# Patient Record
Sex: MALE | Race: WHITE | URBAN - METROPOLITAN AREA
[De-identification: names, ages, dates, MRNs, and addresses within clinical notes are randomized per-mention and may not be internally consistent; named-entity substitution may affect disease eponyms.]

---

## 2017-01-18 ENCOUNTER — OFFICE VISIT (OUTPATIENT)
Dept: INTERNAL MEDICINE | Facility: CLINIC | Age: 32
End: 2017-01-18
Payer: MEDICARE

## 2017-01-18 VITALS
BODY MASS INDEX: 21.7 KG/M2 | HEIGHT: 72 IN | WEIGHT: 160.2 LBS | DIASTOLIC BLOOD PRESSURE: 52 MMHG | HEART RATE: 57 BPM | SYSTOLIC BLOOD PRESSURE: 117 MMHG | TEMPERATURE: 97.4 F

## 2017-01-18 DIAGNOSIS — F31.9 BIPOLAR AFFECTIVE DISORDER, REMISSION STATUS UNSPECIFIED (H): Primary | ICD-10-CM

## 2017-01-18 DIAGNOSIS — F51.05 INSOMNIA DUE TO OTHER MENTAL DISORDER: ICD-10-CM

## 2017-01-18 DIAGNOSIS — F99 INSOMNIA DUE TO OTHER MENTAL DISORDER: ICD-10-CM

## 2017-01-18 PROCEDURE — 99214 OFFICE O/P EST MOD 30 MIN: CPT | Performed by: INTERNAL MEDICINE

## 2017-01-18 RX ORDER — HALOPERIDOL 1 MG/1
1 TABLET ORAL 2 TIMES DAILY
Qty: 60 TABLET | Refills: 1 | Status: SHIPPED | OUTPATIENT
Start: 2017-01-18 | End: 2017-05-03

## 2017-01-18 RX ORDER — HYDROXYZINE HYDROCHLORIDE 25 MG/1
TABLET, FILM COATED ORAL
Qty: 60 TABLET | Refills: 1 | Status: SHIPPED | OUTPATIENT
Start: 2017-01-18 | End: 2018-01-01

## 2017-01-18 NOTE — MR AVS SNAPSHOT
After Visit Summary   1/18/2017    Maico Zacarias    MRN: [id number]           Patient Information     Date Of Birth          1985        Visit Information        Provider Department      1/18/2017 2:20 PM Maeve Joseph MD [facility]        Today's Diagnoses     Bipolar affective disorder, remission status unspecified (H)    -  1       Care Instructions    Someone from our Behavioral Health office should contact you by next week, regarding scheduling an appointment with the Psychiatrist. If you do not hear from anyone by Monday, please call them: Behavioral Healthcare Providers Intake Scheduling [phone].  Please restart your Prozac (Fluoxetine) and Haldol as per prescription bottle instructions.  You may use the hydroxyzine as needed for sleep.  If you begin to feel that you want to hurt yourself or others, we advise you to call the crisis line: [phone]  or [phone].  Please return to clinic in a month.           Follow-ups after your visit        Additional Services     MENTAL HEALTH REFERRAL       Your provider has referred you to: Behavioral Healthcare Providers Intake Scheduling [phone]   [email or web address]    All scheduling is subject to the client's specific insurance plan & benefits, provider/location availability, and provider clinical specialities.  Please arrive 15 minutes early for your first appointment and bring your completed paperwork.    Please be aware that coverage of these services is subject to the terms and limitations of your health insurance plan.  Call member services at your health plan with any benefit or coverage questions.                  Who to contact     If you have questions or need follow up information about today's clinic visit or your schedule please contact [facility] directly at [phone].  Normal or non-critical lab and imaging results will be communicated to you by MyChart, letter or  "phone within 4 business days after the clinic has received the results. If you do not hear from us within 7 days, please contact the clinic through [employer] or phone. If you have a critical or abnormal lab result, we will notify you by phone as soon as possible.  Submit refill requests through [employer] or call your pharmacy and they will forward the refill request to us. Please allow 3 business days for your refill to be completed.          Additional Information About Your Visit        [employer] Information     [employer] lets you send messages to your doctor, view your test results, renew your prescriptions, schedule appointments and more. To sign up, go to [email or web address] . Click on \"Log in\" on the left side of the screen, which will take you to the Welcome page. Then click on \"Sign up Now\" on the right side of the page.     You will be asked to enter the access code listed below, as well as some personal information. Please follow the directions to create your username and password.     Your access code is: [email or web address]  Expires: 2017  3:56 PM     Your access code will  in 90 days. If you need help or a new code, please call your [facility] clinic or [phone].        Care EveryWhere ID     This is your Care EveryWhere ID. This could be used by other organizations to access your [facility] medical records  [id number]        Your Vitals Were     Pulse Temperature Height BMI (Body Mass Index)          57 97.4  F (36.3  C) (Oral) 5' 11.5\" (1.816 m) 22.03 kg/m2         Blood Pressure from Last 3 Encounters:   17 117/52   16 110/70   14 125/60    Weight from Last 3 Encounters:   17 160 lb 3.2 oz (72.666 kg)   16 167 lb (75.751 kg)   14 173 lb (78.472 kg)              We Performed the Following     MENTAL HEALTH REFERRAL          Today's Medication Changes          These changes are accurate as of: 17  3:56 PM.  If you have any questions, ask your nurse or doctor. "               Start taking these medicines.        Dose/Directions    FLUoxetine 20 MG capsule   Commonly known as:  PROzac   Used for:  Bipolar affective disorder, remission status unspecified (H)   Started by:  Maeve Joseph MD        Please take 1 capsule (20mg) by mouth daily for 3 days and then increase to 2 capsules (40mg) daily.   Quantity:  60 capsule   Refills:  1       haloperidol 1 MG tablet   Commonly known as:  HALDOL   Used for:  Bipolar affective disorder, remission status unspecified (H)   Started by:  Maeve Joseph MD        Dose:  1 mg   Take 1 tablet (1 mg) by mouth 2 times daily   Quantity:  60 tablet   Refills:  1       hydrOXYzine 25 MG tablet   Commonly known as:  ATARAX   Used for:  Bipolar affective disorder, remission status unspecified (H)   Started by:  Maeve Joseph MD        For lack of sleep, you can take up to 3-4 tablets (75-100mg) by mouth at bedtime.   Quantity:  60 tablet   Refills:  1            Where to get your medicines      These medications were sent to [facility] [id number] [place] [address] [address] [place] [address] AT [facility]  [address] [facility] [address]     Phone:  [phone]    - FLUoxetine 20 MG capsule  - haloperidol 1 MG tablet  - hydrOXYzine 25 MG tablet             Primary Care Provider Office Phone # Fax #    Maeve Joseph [phone]       [facility] [address] [facility] [address]        Thank you!     Thank you for choosing [facility]  for your care. Our goal is always to provide you with excellent care. Hearing back from our patients is one way we can continue to improve our services. Please take a few minutes to complete the written survey that you may receive in the mail after your visit with us. Thank you!             Your Updated Medication List - Protect others around you: Learn how to safely use, store  and throw away your medicines at www.disposemymeds.org.          This list is accurate as of: 1/18/17  3:56 PM.  Always use your most recent med list.                   Brand Name Dispense Instructions for use    FLUoxetine 20 MG capsule    PROzac    60 capsule    Please take 1 capsule (20mg) by mouth daily for 3 days and then increase to 2 capsules (40mg) daily.       haloperidol 1 MG tablet    HALDOL    60 tablet    Take 1 tablet (1 mg) by mouth 2 times daily       hydrOXYzine 25 MG tablet    ATARAX    60 tablet    For lack of sleep, you can take up to 3-4 tablets (75-100mg) by mouth at bedtime.       NO ACTIVE MEDICATIONS

## 2017-01-18 NOTE — PATIENT INSTRUCTIONS
Someone from our Behavioral Health office should contact you by next week, regarding scheduling an appointment with the Psychiatrist. If you do not hear from anyone by Monday, please call them: Behavioral Healthcare Providers Intake Scheduling [phone].  Please restart your Prozac (Fluoxetine) and Haldol as per prescription bottle instructions.  You may use the hydroxyzine as needed for sleep.  If you begin to feel that you want to hurt yourself or others, we advise you to call the crisis line: [phone]  or [phone].  Please return to clinic in a month.

## 2017-01-18 NOTE — PROGRESS NOTES
SUBJECTIVE:                                                    Maico Zacarias is a 31 year old male who presents to clinic today for the following health issues:      Bipolar d/o:    Patient is here for medications for his bipolar.     Per patient, he was on fluoxetine, and haldol and has been off of both for at least a few months.  He has not seen a Psychiatrist in years. He reports a recent history of worsening mood symptoms. He reports that he has become more depressed and/or a burden for his family to deal with lately. He [facility] about a year ago, who tried to connect him with outpt Psychiatry,but does not seem to happened yet. He is switching his primary to the  clinic.    He has a history of suicide attempts in the past-last suicide attempt was: years ago.    Current suicidal ideation or HI? No. He does at times take apart furniture, as part of his coping process.  We discussed healthier alternatives.   He has seen a therapist in the past;   The patient did fill out an MDQ questionnaire today and screen positive for bipolar.     Problem list and histories reviewed & adjusted, as indicated.  Additional history: as documented    Patient Active Problem List   Diagnosis     Bipolar affective disorder (H)     PTSD (post-traumatic stress disorder)     ADHD (attention deficit hyperactivity disorder)     CARDIOVASCULAR SCREENING; LDL GOAL LESS THAN 160     Ex-smoker     Past Surgical History   Procedure Laterality Date     No history of surgery         Social History   Substance Use Topics     Smoking status: Current Some Day Smoker -- 1.00 packs/day for 4 years     Last Attempt to Quit: 08/07/2012     Smokeless tobacco: Never Used     Alcohol Use: No     Family History   Problem Relation Age of Onset     Alcohol/Drug Father      CANCER Maternal Grandfather      Alcohol/Drug Paternal Grandfather      Depression Paternal Grandfather      Neurologic Disorder Sister   "        Current Outpatient Prescriptions   Medication Sig Dispense Refill     FLUoxetine (PROZAC) 20 MG capsule Please take 1 capsule (20mg) by mouth daily for 3 days and then increase to 2 capsules (40mg) daily. 60 capsule 1     hydrOXYzine (ATARAX) 25 MG tablet For lack of sleep, you can take up to 3-4 tablets (75-100mg) by mouth at bedtime. 60 tablet 1     haloperidol (HALDOL) 1 MG tablet Take 1 tablet (1 mg) by mouth 2 times daily 60 tablet 1     NO ACTIVE MEDICATIONS          ==============================================================  ROS:  Constitutional, HEENT, cardiovascular, pulmonary, GI, , musculoskeletal, neuro, skin, endocrine and psych systems are negative, except as otherwise noted.       OBJECTIVE:                                                    /52 mmHg  Pulse 57  Temp(Src) 97.4  F (36.3  C) (Oral)  Ht 5' 11.5\" (1.816 m)  Wt 160 lb 3.2 oz (72.666 kg)  BMI 22.03 kg/m2  Body mass index is 22.03 kg/(m^2).     GEN: not in acute distress  PSYCH: appropriate mood and affect     ASSESSMENT/PLAN:                                                        ICD-10-CM    1. Bipolar affective disorder, remission status unspecified (H) F31.9 MENTAL HEALTH REFERRAL     FLUoxetine (PROZAC) 20 MG capsule     hydrOXYzine (ATARAX) 25 MG tablet     haloperidol (HALDOL) 1 MG tablet   2. Insomnia due to other mental disorder F51.05     F99      Time spent coordinating care was approximately 30 minutes out of a total of approximately 40 minutes (which was the total duration of the appointment) including the diagnosis, prognosis and treatment of the above medical conditions.     (F31.9) Bipolar affective disorder, remission status unspecified (H)  (primary encounter diagnosis)  Comment: The patient did fill out an MDQ questionnaire today and screen positive for bipolar (will scan in)  Plan:  As per orders above and patient instructions below.    MENTAL HEALTH REFERRAL, FLUoxetine (PROZAC) 20         MG " capsule, hydrOXYzine (ATARAX) 25 MG tablet,         haloperidol (HALDOL) 1 MG tablet      (F51.05,  F99) Insomnia due to other mental disorder  Comment: also reports recent insomnia coinciding with the worsening of his bipolar symptoms  Plan: As per orders above and patient instructions below.      Patient Instructions   Someone from our Behavioral Health office should contact you by next week, regarding scheduling an appointment with the Psychiatrist. If you do not hear from anyone by Monday, please call them: Behavioral Healthcare Providers Intake Scheduling [phone].  Please restart your Prozac (Fluoxetine) and Haldol as per prescription bottle instructions.  You may use the hydroxyzine as needed for sleep.  If you begin to feel that you want to hurt yourself or others, we advise you to call the crisis line: [phone]  or [phone].  Please return to clinic in a month.                        Maeve Joseph MD  [facility]

## 2017-01-18 NOTE — NURSING NOTE
"Chief Complaint   Patient presents with     Manic Behavior     Bipolar medications       Initial /52 mmHg  Pulse 57  Temp(Src) 97.4  F (36.3  C) (Oral)  Ht 5' 11.5\" (1.816 m)  Wt 160 lb 3.2 oz (72.666 kg)  BMI 22.03 kg/m2 Estimated body mass index is 22.03 kg/(m^2) as calculated from the following:    Height as of this encounter: 5' 11.5\" (1.816 m).    Weight as of this encounter: 160 lb 3.2 oz (72.666 kg).  BP completed using cuff size: alex Iglesias CMA    "

## 2017-01-24 ENCOUNTER — TELEPHONE (OUTPATIENT)
Dept: INTERNAL MEDICINE | Facility: CLINIC | Age: 32
End: 2017-01-24

## 2017-01-24 DIAGNOSIS — F31.62 BIPOLAR DISORDER, CURRENT EPISODE MIXED, MODERATE (H): Primary | ICD-10-CM

## 2017-01-24 NOTE — TELEPHONE ENCOUNTER
I have been contacted by [facility] relating that the patient would like to see a  Psychiatrist-I put in a new mental health referral. Please call the patient and advise him to call [phone] to schedule an appointment with a  Psychiatrist.    Thank you,  Maeve Joseph MD

## 2017-01-26 ENCOUNTER — OFFICE VISIT (OUTPATIENT)
Dept: INTERNAL MEDICINE | Facility: CLINIC | Age: 32
End: 2017-01-26
Payer: MEDICARE

## 2017-01-26 VITALS
HEART RATE: 51 BPM | WEIGHT: 163 LBS | BODY MASS INDEX: 22.42 KG/M2 | OXYGEN SATURATION: 98 % | SYSTOLIC BLOOD PRESSURE: 109 MMHG | TEMPERATURE: 97.1 F | DIASTOLIC BLOOD PRESSURE: 48 MMHG | RESPIRATION RATE: 18 BRPM

## 2017-01-26 DIAGNOSIS — F31.62 BIPOLAR DISORDER, CURRENT EPISODE MIXED, MODERATE (H): Primary | ICD-10-CM

## 2017-01-26 PROCEDURE — 99213 OFFICE O/P EST LOW 20 MIN: CPT | Performed by: INTERNAL MEDICINE

## 2017-01-26 NOTE — Clinical Note
[facility]  [address] [facility] [address] [phone]      2017    In regards to the following patient:  Maico Zacarias  [address] [facility] [address]  : 1985     Case number: unknown.    To whom it may concern/[facility] representative,    This patient has a diagnosis of Bipolar disorder, current episode mixed, moderate.  The length of time that he is currently deemed to be unable to work would be 3 months from today's date. In that time frame, the patient is expected to establish with a Psychiatrist, who may extend this dispensation.         Sincerely,        Maeve Joseph MD     [facility]  [address] [facility] [address]  Phone: [phone]  Fax: [phone]

## 2017-01-26 NOTE — MR AVS SNAPSHOT
After Visit Summary   1/26/2017    Maico Zacarias    MRN: [id number]           Patient Information     Date Of Birth          1985        Visit Information        Provider Department      1/26/2017 3:10 PM Maeve Joseph MD [facility]        Care Instructions    You will need to supply us with a case number and the address and phone (and fax number) of the place in [facility]. It may be best for them to send us the form by fax.     Our address and fax number:   [facility]  [address] [facility] [address]  Phone: [phone]  Fax: [phone]         Follow-ups after your visit        Your next 10 appointments already scheduled     Feb 15, 2017  2:00 PM   Office Visit with Maeve Joseph MD   [facility] ([facility])    [address] [facility] [address] [phone]           Bring a current list of meds and any records pertaining to this visit.  For Physicals, please bring immunization records and any forms needing to be filled out.  Please arrive 10 minutes early to complete paperwork.              Who to contact     If you have questions or need follow up information about today's clinic visit or your schedule please contact [facility] directly at [phone].  Normal or non-critical lab and imaging results will be communicated to you by MyChart, letter or phone within 4 business days after the clinic has received the results. If you do not hear from us within 7 days, please contact the clinic through MyChart or phone. If you have a critical or abnormal lab result, we will notify you by phone as soon as possible.  Submit refill requests through [employer] or call your pharmacy and they will forward the refill request to us. Please allow 3 business days for your refill to be completed.          Additional Information About Your Visit        MyChart Information     [facility]  "lets you send messages to your doctor, view your test results, renew your prescriptions, schedule appointments and more. To sign up, go to [email or web address] . Click on \"Log in\" on the left side of the screen, which will take you to the Welcome page. Then click on \"Sign up Now\" on the right side of the page.     You will be asked to enter the access code listed below, as well as some personal information. Please follow the directions to create your username and password.     Your access code is: [email or web address]  Expires: 2017  3:56 PM     Your access code will  in 90 days. If you need help or a new code, please call your [facility] clinic or [phone].        Care EveryWhere ID     This is your Care EveryWhere ID. This could be used by other organizations to access your [facility] medical records  [id number]        Your Vitals Were     Pulse Temperature Respirations Pulse Oximetry          51 97.1  F (36.2  C) (Oral) 18 98%         Blood Pressure from Last 3 Encounters:   17 109/48   17 117/52   16 110/70    Weight from Last 3 Encounters:   17 163 lb (73.936 kg)   17 160 lb 3.2 oz (72.666 kg)   16 167 lb (75.751 kg)              Today, you had the following     No orders found for display       Primary Care Provider Office Phone # Fax #    Maeve Joseph [phone]       [facility] [address] [facility] [address]        Thank you!     Thank you for choosing [facility]  for your care. Our goal is always to provide you with excellent care. Hearing back from our patients is one way we can continue to improve our services. Please take a few minutes to complete the written survey that you may receive in the mail after your visit with us. Thank you!             Your Updated Medication List - Protect others around you: Learn how to safely use, store and throw away your medicines at www.disposemymeds.org.    "       This list is accurate as of: 1/26/17  4:21 PM.  Always use your most recent med list.                   Brand Name Dispense Instructions for use    FLUoxetine 20 MG capsule    PROzac    60 capsule    Please take 1 capsule (20mg) by mouth daily for 3 days and then increase to 2 capsules (40mg) daily.       haloperidol 1 MG tablet    HALDOL    60 tablet    Take 1 tablet (1 mg) by mouth 2 times daily       hydrOXYzine 25 MG tablet    ATARAX    60 tablet    For lack of sleep, you can take up to 3-4 tablets (75-100mg) by mouth at bedtime.       NO ACTIVE MEDICATIONS

## 2017-01-26 NOTE — NURSING NOTE
"Chief Complaint   Patient presents with     Forms       Initial /48 mmHg  Pulse 51  Temp(Src) 97.1  F (36.2  C) (Oral)  Resp 18  Wt 163 lb (73.936 kg)  SpO2 98% Estimated body mass index is 22.42 kg/(m^2) as calculated from the following:    Height as of 1/18/17: 5' 11.5\" (1.816 m).    Weight as of this encounter: 163 lb (73.936 kg).  BP completed using cuff size: alex Benitez CMA    "

## 2017-01-26 NOTE — PROGRESS NOTES
"  SUBJECTIVE:                                                    Maico Zacarias is a 31 year old male who presents to clinic today for the following health issues:    Bipolar and Request for a Form:  \"Patient needs letter to be written re: mental health [facility].\"  The patient is accompanied by his mother today who reports that they need a letter from a physician stating that the patient cannot work for up to a year due to his bipolar-and that they need the form to be at the [facility] by 1.31.17 in order for him to continue to receive [facility] coverage. When I asked when they were notified of the need for the form the mother replied that it was the beginning of this week.  The patient does not have the [facility] case number on him nor the exact contact information of the office where this office need to end up. The mother suggested that I write a letter now and they can just drop it off by that [facility] office. I tried to instill reasonable expectations and explained to the patient and his mother that we would need to obtain records from his previous PCP (he has not followed-up with a Psychiatrist for > 1yr) to get more of a background of his illness and of previous similar forms. I further advised that the patient ask the [facility] office fax us the form, we will need the [facility]'s basic contact information and the patient's case number to complete it.   I saw the patient for his bipolar for the first time a few weeks ago (and saw him for acute illness previously), and he did score positive on the MDQ questionnaire at that time. He was restarted on his previous bipolar medications at that visit.          Problem list and histories reviewed & adjusted, as indicated.  Additional history: as documented    Patient Active Problem List   Diagnosis     Bipolar affective disorder (H)     PTSD (post-traumatic stress disorder)     ADHD (attention deficit hyperactivity disorder)     CARDIOVASCULAR " SCREENING; LDL GOAL LESS THAN 160     Ex-smoker     Past Surgical History   Procedure Laterality Date     No history of surgery         Social History   Substance Use Topics     Smoking status: Current Some Day Smoker -- 1.00 packs/day for 4 years     Last Attempt to Quit: 08/07/2012     Smokeless tobacco: Never Used     Alcohol Use: No     Family History   Problem Relation Age of Onset     Alcohol/Drug Father      CANCER Maternal Grandfather      Alcohol/Drug Paternal Grandfather      Depression Paternal Grandfather      Neurologic Disorder Sister          Current Outpatient Prescriptions   Medication Sig Dispense Refill     FLUoxetine (PROZAC) 20 MG capsule Please take 1 capsule (20mg) by mouth daily for 3 days and then increase to 2 capsules (40mg) daily. 60 capsule 1     hydrOXYzine (ATARAX) 25 MG tablet For lack of sleep, you can take up to 3-4 tablets (75-100mg) by mouth at bedtime. 60 tablet 1     haloperidol (HALDOL) 1 MG tablet Take 1 tablet (1 mg) by mouth 2 times daily 60 tablet 1     NO ACTIVE MEDICATIONS        ==============================================================  ROS:  Constitutional, HEENT, cardiovascular, pulmonary, GI, , musculoskeletal, neuro, skin, endocrine and psych systems are negative, except as otherwise noted.       OBJECTIVE:                                                    /48 mmHg  Pulse 51  Temp(Src) 97.1  F (36.2  C) (Oral)  Resp 18  Wt 163 lb (73.936 kg)  SpO2 98%  Body mass index is 22.42 kg/(m^2).     GEN: not in acute distress  PSYCH: appropriate mood and affect      ASSESSMENT/PLAN:                                                        ICD-10-CM    1. Bipolar disorder, current episode mixed, moderate (H) F31.62      ASSESSMENT: requesting a form, as above  PLAN:  I did receive the previous PCP records which reports that the PCP had requested further at that time, as well (and this was just about a year ago). Given the patient's recent  Positive MDQ  questionnaire, I will fill out the form for 3 months' time, with the understanding that he establish with a Psychiatrist within that  Time frame, who can then extend the form.  Should the patient not be able to establish with a Psychiatrist within that time, I will not be able to extend the form on behalf of the patient, as his diagnoses necessitate management by Psychiatry, certainly at a severity which affects his ability to work.  He has expressed interest in establishing with  psychiatry after his last appointment.     Patient Instructions   You will need to supply us with a case number and the address and phone (and fax number) of the place in [facility]. It may be best for them to send us the form by fax.     Our address and fax number:   [facility]  [address] [facility] [address]  Phone: [phone]  Fax: [phone]                   Maeve Joseph MD  [facility]

## 2017-01-30 ENCOUNTER — TELEPHONE (OUTPATIENT)
Dept: INTERNAL MEDICINE | Facility: CLINIC | Age: 32
End: 2017-01-30

## 2017-01-30 NOTE — TELEPHONE ENCOUNTER
I filled out the form and attached a letter, all in the TC basket-please notify the patient of this and please read the letter to him-I cannot fill out the form for longer than 3 months and a Psychiatrist may fill this out for longer. I therefore do advise him to establish with a Psychiatrist ASAP, as it may take up to 3 months to do so.   Thank you,  Maeve Joseph MD

## 2017-01-30 NOTE — TELEPHONE ENCOUNTER
Patient is calling to inform pcp that Medical opinion form is being fax to clinic attn: provider from [facility] but will not have patient name on this form it will be blank form that pcp will need to fill out and fax back to number on form,   Please call to advice  Thank you

## 2017-01-31 ENCOUNTER — TELEPHONE (OUTPATIENT)
Dept: FAMILY MEDICINE | Facility: CLINIC | Age: 32
End: 2017-01-31

## 2017-01-31 PROBLEM — F31.62 BIPOLAR DISORDER, CURRENT EPISODE MIXED, MODERATE (H): Status: ACTIVE | Noted: 2017-01-31

## 2017-01-31 NOTE — TELEPHONE ENCOUNTER
The psychiatric referral is too far for him to travel in a cab alone.  Is there a closer location?

## 2017-01-31 NOTE — TELEPHONE ENCOUNTER
See other message.  Pt called back with case number.  Pt notified of provider message as written and letter read to pt.  Trini Ambrosio RN

## 2017-01-31 NOTE — TELEPHONE ENCOUNTER
Faxed to [facility], [place], operated by [facility] Attn Josselin.  Trini read letter to him.  Edith Bardales, cma

## 2017-01-31 NOTE — TELEPHONE ENCOUNTER
Called patient, he stated all the locations on the referral that Dr. Maeve Joseph had written are too far away. He needs to take a taxi or have a friend take him to the appointments.     Advised patient to call the phone # listed on the back of his insurance card and call to see what psychiatry providers he is covered to see. If he needs a referral he will call the clinic with referral info. Patient was appreciative of this info. He verbalized understanding.     IGNACIA Santos RN

## 2017-02-10 DIAGNOSIS — F31.9 BIPOLAR AFFECTIVE DISORDER, REMISSION STATUS UNSPECIFIED (H): Primary | ICD-10-CM

## 2017-02-10 NOTE — TELEPHONE ENCOUNTER
Per pt pharmacy plan does not cover Hydroxyzine 25 mg tab medication, please call plan @ [phone] to initiate prior authorization or call/fax pharmacy to change medication patient ID # is [id number]. Sole Whipple MA

## 2017-02-10 NOTE — TELEPHONE ENCOUNTER
PA for Hydroxyzine started through cover my meds  Key [id number]  Will await a response.  Edith Bardales, cma

## 2017-02-14 NOTE — TELEPHONE ENCOUNTER
Hydroxyzine not covered had to start an appeal. Can be up to 1 business day for a response.  Edith Bardales, cma

## 2017-02-14 NOTE — TELEPHONE ENCOUNTER
Please provide me with copy of the last PA appeal (I would like to see what the basis of the appeal was and what the basis for declining the appeal was).  As the patient is due for his follow-up in a few days, please remind him and offer to help schedule a clinic appointment and we will discuss other treatment options at that point.   Thank you,  Maeve Joseph MD

## 2017-02-14 NOTE — TELEPHONE ENCOUNTER
Pa and appeal has been denied for Hydroxyzine. Please advise. No alternative given.  Edith Bardales, cma

## 2017-02-15 ENCOUNTER — OFFICE VISIT (OUTPATIENT)
Dept: INTERNAL MEDICINE | Facility: CLINIC | Age: 32
End: 2017-02-15
Payer: COMMERCIAL

## 2017-02-15 VITALS
SYSTOLIC BLOOD PRESSURE: 107 MMHG | BODY MASS INDEX: 22.08 KG/M2 | OXYGEN SATURATION: 96 % | DIASTOLIC BLOOD PRESSURE: 42 MMHG | TEMPERATURE: 98 F | HEIGHT: 72 IN | HEART RATE: 48 BPM | WEIGHT: 163 LBS

## 2017-02-15 DIAGNOSIS — R00.1 BRADYCARDIA: Primary | ICD-10-CM

## 2017-02-15 DIAGNOSIS — F43.10 PTSD (POST-TRAUMATIC STRESS DISORDER): ICD-10-CM

## 2017-02-15 DIAGNOSIS — F31.9 BIPOLAR AFFECTIVE DISORDER, REMISSION STATUS UNSPECIFIED (H): ICD-10-CM

## 2017-02-15 PROCEDURE — 93000 ELECTROCARDIOGRAM COMPLETE: CPT | Performed by: INTERNAL MEDICINE

## 2017-02-15 PROCEDURE — 99214 OFFICE O/P EST MOD 30 MIN: CPT | Performed by: INTERNAL MEDICINE

## 2017-02-15 RX ORDER — HYDROXYZINE HYDROCHLORIDE 25 MG/1
TABLET, FILM COATED ORAL
Qty: 60 TABLET | Refills: 1 | OUTPATIENT
Start: 2017-02-15

## 2017-02-15 NOTE — NURSING NOTE
"Chief Complaint   Patient presents with     RECHECK     Bipolar       Initial /42 (BP Location: Right arm, Patient Position: Chair, Cuff Size: Adult Large)  Pulse (!) 48  Temp 98  F (36.7  C) (Oral)  Ht 5' 11.5\" (1.816 m)  Wt 163 lb (73.9 kg)  SpO2 96%  BMI 22.42 kg/m2 Estimated body mass index is 22.42 kg/(m^2) as calculated from the following:    Height as of this encounter: 5' 11.5\" (1.816 m).    Weight as of this encounter: 163 lb (73.9 kg).  Medication Reconciliation: complete    Oxana Iglesias CMA  "

## 2017-02-15 NOTE — PATIENT INSTRUCTIONS
Please make sure to find a Psychiatrist as soon as possible.  You can try taking Benadryl at nighttime as needed for lack of sleep: you can take 25-50mg at bedtime.    Please decrease your Prozac from 40mg to 20mg daily.  Continue the Haldol as 2mg at bedtime, as you have been taking it.  Please return to clinic in 1 month.  You can the [facility] and ask if you can be seen there: [phone].

## 2017-02-15 NOTE — MR AVS SNAPSHOT
"              After Visit Summary   2/15/2017    Maico Zacarias    MRN: [id number]           Patient Information     Date Of Birth          1985        Visit Information        Provider Department      2/15/2017 3:20 PM Maeve Joseph MD [facility]        Today's Diagnoses     Bradycardia    -  1    Bipolar affective disorder, remission status unspecified (H)          Care Instructions      Please make sure to find a Psychiatrist as soon as possible.  You can try taking Benadryl at nighttime as needed for lack of sleep: you can take 25-50mg at bedtime.    Please decrease your Prozac from 40mg to 20mg daily.  Continue the Haldol as 2mg at bedtime, as you have been taking it.  Please return to clinic in 1 month.  You can the [facility] and ask if you can be seen there: [phone].            Follow-ups after your visit        Who to contact     If you have questions or need follow up information about today's clinic visit or your schedule please contact [facility] directly at [phone].  Normal or non-critical lab and imaging results will be communicated to you by UNATIONhart, letter or phone within 4 business days after the clinic has received the results. If you do not hear from us within 7 days, please contact the clinic through UNATIONhart or phone. If you have a critical or abnormal lab result, we will notify you by phone as soon as possible.  Submit refill requests through Desall or call your pharmacy and they will forward the refill request to us. Please allow 3 business days for your refill to be completed.          Additional Information About Your Visit        UNATIONhart Information     Desall lets you send messages to your doctor, view your test results, renew your prescriptions, schedule appointments and more. To sign up, go to [email or web address] . Click on \"Log in\" on the left side of the screen, which will take you to the " "Welcome page. Then click on \"Sign up Now\" on the right side of the page.     You will be asked to enter the access code listed below, as well as some personal information. Please follow the directions to create your username and password.     Your access code is: [email or web address]  Expires: 2017  3:56 PM     Your access code will  in 90 days. If you need help or a new code, please call your [place] clinic or [phone].        Care EveryWhere ID     This is your Care EveryWhere ID. This could be used by other organizations to access your [place] medical records  [id number]        Your Vitals Were     Pulse Temperature Height Pulse Oximetry BMI (Body Mass Index)       48 98  F (36.7  C) (Oral) 5' 11.5\" (1.816 m) 96% 22.42 kg/m2        Blood Pressure from Last 3 Encounters:   02/15/17 107/42   17 109/48   17 117/52    Weight from Last 3 Encounters:   02/15/17 163 lb (73.9 kg)   17 163 lb (73.9 kg)   17 160 lb 3.2 oz (72.7 kg)              We Performed the Following     EKG 12-lead complete w/read - Clinics          Today's Medication Changes          These changes are accurate as of: 2/15/17  5:47 PM.  If you have any questions, ask your nurse or doctor.               These medicines have changed or have updated prescriptions.        Dose/Directions    FLUoxetine 20 MG capsule   Commonly known as:  PROzac   This may have changed:    - how much to take  - how to take this  - when to take this  - additional instructions   Used for:  Bipolar affective disorder, remission status unspecified (H)   Changed by:  Maeve Joseph MD        Dose:  20 mg   Take 1 capsule (20 mg) by mouth daily Please take 1 capsule (20mg) by mouth daily   Quantity:  30 capsule   Refills:  0       haloperidol 1 MG tablet   Commonly known as:  HALDOL   This may have changed:    - how much to take  - when to take this  - additional instructions   Used for:  Bipolar affective disorder, remission status " unspecified (H)        Dose:  1 mg   Take 1 tablet (1 mg) by mouth 2 times daily   Quantity:  60 tablet   Refills:  1         Stop taking these medicines if you haven't already. Please contact your care team if you have questions.     NO ACTIVE MEDICATIONS   Stopped by:  Maeve Joseph MD                    Primary Care Provider Office Phone # Fax #    Maeve Joseph [phone]       [facility] [address] [facility] [address]        Thank you!     Thank you for choosing [facility]  for your care. Our goal is always to provide you with excellent care. Hearing back from our patients is one way we can continue to improve our services. Please take a few minutes to complete the written survey that you may receive in the mail after your visit with us. Thank you!             Your Updated Medication List - Protect others around you: Learn how to safely use, store and throw away your medicines at www.disposemymeds.org.          This list is accurate as of: 2/15/17  5:47 PM.  Always use your most recent med list.                   Brand Name Dispense Instructions for use    FLUoxetine 20 MG capsule    PROzac    30 capsule    Take 1 capsule (20 mg) by mouth daily Please take 1 capsule (20mg) by mouth daily       haloperidol 1 MG tablet    HALDOL    60 tablet    Take 1 tablet (1 mg) by mouth 2 times daily       hydrOXYzine 25 MG tablet    ATARAX    60 tablet    For lack of sleep, you can take up to 3-4 tablets (75-100mg) by mouth at bedtime.

## 2017-02-15 NOTE — PROGRESS NOTES
SUBJECTIVE:                                                    Maico Zacarias is a 31 year old male who presents to clinic today for the following health issues:      Bipolar disorder, PTSD  We (re)started the patient on his Haldol and prozac a month ago.   He feels like the medications have been helping well. He did adjust some of the timings of the medications (ie, takes all of daily Haldol at night-feels like it helps him sleep). He has been out of his Hydroxyzine since 2/10/2017. His insurance does not cover it. The form that we filled out (to last the patient 3 months) allowed him to keep [employer], but per patient, he is no longer on Medicaid/medicare.  He reports being on disability since he was 18yo.       Bradycardia:  His HR is 48 bpm today on remeasurement. He reports a history of dizziness, but not worse in the past month. We did (re) start the patient on his Haldol and prozac a month ago.   He is physically active, does crunches at home (though apparently less, since he started the medications).  He denies lightheadedness, or dizziness, or feeling of his heart skipping beats. No CP or shortness of breath.     He has a gland under the right jaw which is starting to bother him - has a history of dental issues.            Problem list and histories reviewed & adjusted, as indicated.  Additional history: as documented    Patient Active Problem List   Diagnosis     Bipolar affective disorder (H)     PTSD (post-traumatic stress disorder)     ADHD (attention deficit hyperactivity disorder)     CARDIOVASCULAR SCREENING; LDL GOAL LESS THAN 160     Ex-smoker     Bipolar disorder, current episode mixed, moderate (H)     Past Surgical History   Procedure Laterality Date     No history of surgery         Social History   Substance Use Topics     Smoking status: Current Some Day Smoker     Packs/day: 1.00     Years: 4.00     Last attempt to quit: 8/7/2012     Smokeless tobacco: Never Used     Alcohol use No     Family  "History   Problem Relation Age of Onset     Alcohol/Drug Father      CANCER Maternal Grandfather      Alcohol/Drug Paternal Grandfather      Depression Paternal Grandfather      Neurologic Disorder Sister          Current Outpatient Prescriptions   Medication Sig Dispense Refill     FLUoxetine (PROZAC) 20 MG capsule Please take 1 capsule (20mg) by mouth daily for 3 days and then increase to 2 capsules (40mg) daily. (Patient taking differently: 40 mg daily Please take 1 capsule (20mg) by mouth daily for 3 days and then increase to 2 capsules (40mg) daily. On 2/15/2017 patient states he takes 40 mg daily.) 60 capsule 1     hydrOXYzine (ATARAX) 25 MG tablet For lack of sleep, you can take up to 3-4 tablets (75-100mg) by mouth at bedtime. (Patient not taking: Reported on 2/15/2017) 60 tablet 1     haloperidol (HALDOL) 1 MG tablet Take 1 tablet (1 mg) by mouth 2 times daily (Patient taking differently: Take 2 mg by mouth At Bedtime 2/15/2017 patient states he takes 2 tablets at 10pm.) 60 tablet 1     ==============================================================  ROS:  Constitutional, HEENT, cardiovascular, pulmonary, GI, , musculoskeletal, neuro, skin, endocrine and psych systems are negative, except as otherwise noted.       OBJECTIVE:                                                    /42 (BP Location: Right arm, Patient Position: Chair, Cuff Size: Adult Large)  Pulse (!) 48  Temp 98  F (36.7  C) (Oral)  Ht 5' 11.5\" (1.816 m)  Wt 163 lb (73.9 kg)  SpO2 96%  BMI 22.42 kg/m2  Body mass index is 22.42 kg/(m^2).     GEN: not in acute distress  PSYCH: appropriate mood and affect     EKG: reviewed by me: sinus chetna too the high 40s.    ASSESSMENT/PLAN:                                                        ICD-10-CM    1. Bradycardia R00.1 EKG 12-lead complete w/read - Clinics   2. Bipolar affective disorder, remission status unspecified (H) F31.9 FLUoxetine (PROZAC) 20 MG capsule   3. PTSD (post-traumatic stress " disorder) F43.10      Time spent coordinating care was approximately 30 minutes out of a total of approximately 40 minutes (which was the total duration of the appointment) including the diagnosis, prognosis and treatment of the above medical conditions.     (R00.1) Bradycardia  (primary encounter diagnosis)  Comment: perhaps may be d/t prozac (more likely to cause bradycardia then Haldol)  Plan: EKG 12-lead complete w/read - Clinics         As per orders above and patient instructions below. -decrease the morning Prozac-this should also help improve his insomnia    (F31.9) Bipolar affective disorder, remission status   unspecified (H)  PTSD:  Comment: improved  Plan:   As per orders above and patient instructions below.         Will plan on having the patient fill out the MDQ questionnaire at the next visit       Patient Instructions     Please make sure to find a Psychiatrist as soon as possible.  You can try taking Benadryl at nighttime as needed for lack of sleep: you can take 25-50mg at bedtime.    Please decrease your Prozac from 40mg to 20mg daily.  Continue the Haldol as 2mg at bedtime, as you have been taking it.  Please return to clinic in 1 month.  You can the [facility] and ask if you can be seen there: [phone].                     Maeve Joseph MD  [facility]

## 2017-02-16 NOTE — TELEPHONE ENCOUNTER
Patient was seen in clinic today-we was advised the use of an alternative agent for his insomnia (the main indication of his hydroxyzine use).

## 2017-03-02 ENCOUNTER — TELEPHONE (OUTPATIENT)
Dept: INTERNAL MEDICINE | Facility: CLINIC | Age: 32
End: 2017-03-02

## 2017-03-02 NOTE — TELEPHONE ENCOUNTER
PA needed for Fluoxetine 20mg tablet. Plan does not cover this medication per [facility].    [phone]  ID: [id number]    Sierra Hdz MA

## 2017-03-03 NOTE — TELEPHONE ENCOUNTER
Capsules are covered. Notified pharmacy they will let the patient know when ready.  Edith Bardales, cma

## 2017-03-24 ENCOUNTER — OFFICE VISIT (OUTPATIENT)
Dept: INTERNAL MEDICINE | Facility: CLINIC | Age: 32
End: 2017-03-24
Payer: COMMERCIAL

## 2017-03-24 VITALS
DIASTOLIC BLOOD PRESSURE: 54 MMHG | HEART RATE: 53 BPM | OXYGEN SATURATION: 97 % | WEIGHT: 172.2 LBS | TEMPERATURE: 98.3 F | BODY MASS INDEX: 23.68 KG/M2 | SYSTOLIC BLOOD PRESSURE: 109 MMHG

## 2017-03-24 DIAGNOSIS — F31.62 BIPOLAR DISORDER, CURRENT EPISODE MIXED, MODERATE (H): Primary | ICD-10-CM

## 2017-03-24 PROCEDURE — 99214 OFFICE O/P EST MOD 30 MIN: CPT | Performed by: INTERNAL MEDICINE

## 2017-03-24 ASSESSMENT — ANXIETY QUESTIONNAIRES
5. BEING SO RESTLESS THAT IT IS HARD TO SIT STILL: NEARLY EVERY DAY
GAD7 TOTAL SCORE: 13
2. NOT BEING ABLE TO STOP OR CONTROL WORRYING: MORE THAN HALF THE DAYS
6. BECOMING EASILY ANNOYED OR IRRITABLE: MORE THAN HALF THE DAYS
IF YOU CHECKED OFF ANY PROBLEMS ON THIS QUESTIONNAIRE, HOW DIFFICULT HAVE THESE PROBLEMS MADE IT FOR YOU TO DO YOUR WORK, TAKE CARE OF THINGS AT HOME, OR GET ALONG WITH OTHER PEOPLE: VERY DIFFICULT
3. WORRYING TOO MUCH ABOUT DIFFERENT THINGS: SEVERAL DAYS
7. FEELING AFRAID AS IF SOMETHING AWFUL MIGHT HAPPEN: SEVERAL DAYS
1. FEELING NERVOUS, ANXIOUS, OR ON EDGE: SEVERAL DAYS

## 2017-03-24 ASSESSMENT — PATIENT HEALTH QUESTIONNAIRE - PHQ9: 5. POOR APPETITE OR OVEREATING: NEARLY EVERY DAY

## 2017-03-24 NOTE — PROGRESS NOTES
SUBJECTIVE:                                                    Maico Zacarias is a 31 year old male who presents to clinic today for the following health issues:        Bipolar and PTSD Follow-Up    Status since last visit: No change but more manic episodes    Other associated symptoms: manic episodes, feel loss of focus on projects    Complicating factors:     Significant life event: No     Current substance abuse: None    No flowsheet data found.  No flowsheet data found.     PHQ-9  English      PHQ-9   Any Language     GAD7     No flowsheet data found.-PHQ-9 no previous ones    PHQ-9 SCORE 3/24/2017   Total Score 14     No flowsheet data found.-EAN-7 no previous ones    EAN-7 SCORE 3/24/2017   Total Score 13       At the last appointment with us 6 weeks ago, we had the patient decrease his Prozac from 40mg to 20mg a day d/t a noted (worsening) bradycardia, also in the hopes of improving his insomnia.  Of note he takes his Haldol apparently once a day (ie, 2mg at bedtime) as he feels this alleviates some of his insomnia.   The patient reports that it is not so much the depression or anxiety that are the problem (though the scores are medium to high) but that he is getting manic-has pressured speech at times. He does not engage in high risk behaviors.  Takes his energy out in video games.   He has tried finding a psychiatrist-we referred him to -affiliated psychiatrists, but apparently there are problems with insurance coverage. He also reports calling several other places and some of them offered an 8 month waiting period, vs others were he could be seen sooner, but he reports a real problem with tranportation and is limited by where his family and friends are willing to drive him (ie, not very far from home). We discussed possibly setting him up with a medical taxi (or similar) or [employer], but he reports anxiety around strangers and does not think he can handle this. We will reach out to our Care  Coordinator to see if they can help with scheduling an appointment with Psychiatry.(referal put in today).  Also a longterm problem of falling asleep - Insomnia - takes 100mg of benadryl at night, without an effect.  Will avoid Trazodone as patient reports abusing it in the past.  There are also some records of the medications which he has been on previously, in CareEverywhere.      Dental issues:  The patient saw a dentist recently and is planned for 4 surgical extractions. The patient was put on ibuprofen 800mg q8h, and percocet 5-335mg.         Amount of exercise or physical activity: no set routine. But is constantly moving     Problems taking medications regularly: No    Medication side effects: none    Diet: regular (no restrictions)      Problem list and histories reviewed & adjusted, as indicated.  Additional history: as documented    Patient Active Problem List   Diagnosis     Bipolar affective disorder (H)     PTSD (post-traumatic stress disorder)     ADHD (attention deficit hyperactivity disorder)     CARDIOVASCULAR SCREENING; LDL GOAL LESS THAN 160     Ex-smoker     Bipolar disorder, current episode mixed, moderate (H)     Past Surgical History:   Procedure Laterality Date     NO HISTORY OF SURGERY         Social History   Substance Use Topics     Smoking status: Current Some Day Smoker     Packs/day: 1.00     Years: 4.00     Last attempt to quit: 8/7/2012     Smokeless tobacco: Never Used     Alcohol use No     Family History   Problem Relation Age of Onset     Alcohol/Drug Father      CANCER Maternal Grandfather      Alcohol/Drug Paternal Grandfather      Depression Paternal Grandfather      Neurologic Disorder Sister          Current Outpatient Prescriptions   Medication Sig Dispense Refill     DiphenhydrAMINE HCl (BENADRYL PO) 25 mg Patient takes 4 tablets (100mg)at bedtime.       FLUoxetine (PROZAC) 20 MG capsule Take 1 capsule (20 mg) by mouth daily Please take 1 capsule (20mg) by mouth daily 30  capsule 0     haloperidol (HALDOL) 1 MG tablet Take 1 tablet (1 mg) by mouth 2 times daily (Patient taking differently: Take 2 mg by mouth At Bedtime 2/15/2017 patient states he takes 2 tablets at 10pm.) 60 tablet 1     hydrOXYzine (ATARAX) 25 MG tablet For lack of sleep, you can take up to 3-4 tablets (75-100mg) by mouth at bedtime. (Patient not taking: Reported on 2/15/2017) 60 tablet 1       Reviewed and updated as needed this visit by clinical staff  Tobacco  Allergies  Meds  Med Hx  Surg Hx  Fam Hx  Soc Hx      Reviewed and updated as needed this visit by Provider         ==============================================================  ROS:  Constitutional, HEENT, cardiovascular, pulmonary, GI, , musculoskeletal, neuro, skin, endocrine and psych systems are negative, except as otherwise noted.       OBJECTIVE:                                                    /54 (BP Location: Right arm, Patient Position: Chair, Cuff Size: Adult Regular)  Pulse 53  Temp 98.3  F (36.8  C) (Oral)  Wt 172 lb 3.2 oz (78.1 kg)  SpO2 97%  BMI 23.68 kg/m2  Body mass index is 23.68 kg/(m^2).     GEN: not in acute distress  PSYCH: appropriate mood and affect      ASSESSMENT/PLAN:                                                        ICD-10-CM    1. Bipolar disorder, current episode mixed, moderate (H) F31.62 CARE COORDINATION REFERRAL     Time spent coordinating care was approximately 30 minutes out of a total of approximately 40 minutes (which was the total duration of the appointment) including the diagnosis, prognosis and treatment of the above medical conditions.     (F31.62) Bipolar disorder, current episode mixed, moderate (H)  (primary encounter diagnosis)  Comment: worsening pete, no clear trigger (and we have actually decreased his SSRI 6 weeks ago)  Plan:   As per orders above and patient instructions below.   CARE COORDINATION REFERRAL           Patient Instructions   Our Care Coordinator will help you  schedule a Psychiatry appointment.    I will contact a [place] Psychiatrist (by phone/electronic medical record) to see how we optimize your mood regimen-and we will advise the next visit based on this. Contact our clinic if you do not hear from us by Wednesday.    For your sleep:  Stop the benadryl.   Please avoid all caffeine in the afternoon.                     Maeve Joseph MD  [facility]

## 2017-03-24 NOTE — PATIENT INSTRUCTIONS
Our Care Coordinator will help you schedule a Psychiatry appointment.    I will contact a [place] Psychiatrist (by phone/electronic medical record) to see how we optimize your mood regimen-and we will advise the next visit based on this. Contact our clinic if you do not hear from us by Wednesday.    For your sleep:  Stop the benadryl.   Please avoid all caffeine in the afternoon.

## 2017-03-24 NOTE — NURSING NOTE
"Chief Complaint   Patient presents with     Depression     Anxiety       Initial /54 (BP Location: Right arm, Patient Position: Chair, Cuff Size: Adult Regular)  Pulse 53  Temp 98.3  F (36.8  C) (Oral)  Wt 172 lb 3.2 oz (78.1 kg)  SpO2 97%  BMI 23.68 kg/m2 Estimated body mass index is 23.68 kg/(m^2) as calculated from the following:    Height as of 2/15/17: 5' 11.5\" (1.816 m).    Weight as of this encounter: 172 lb 3.2 oz (78.1 kg).  Medication Reconciliation: complete    Oxana Iglesias CMA  "

## 2017-03-24 NOTE — MR AVS SNAPSHOT
After Visit Summary   3/24/2017    Maico Zacarias    MRN: [id number]           Patient Information     Date Of Birth          1985        Visit Information        Provider Department      3/24/2017 2:10 PM Maeve Joseph MD [facility]        Today's Diagnoses     Bipolar disorder, current episode mixed, moderate (H)    -  1      Care Instructions    Our Care Coordinator will help you schedule a Psychiatry.    For your sleep:  Stop the benadryl.   Please avoid all caffeine in the afternoon.    I will contact a [place] Psychiatrist (by phone) to see how we optimize your mood regimen-and we will advise the next visit based on this. Contact our clinic if you do not hear from us by Wednesday.          Follow-ups after your visit        Additional Services     CARE COORDINATION REFERRAL       Services are provided by a Care Coordinator for people with complex needs such as: medical, social, or financial troubles.  The Care Coordinator works with the patient and their Primary Care Provider to determine health goals, obtain resources, achieve outcomes, and develop care plans that help coordinate the patient's care.     Reason for Referral: Other: assistance scheduling with Psychiatry is requested-limitations include, coverage limitations with insurance, and patient relies on family and friends for transport as report claustrophobia (thus no buses) and stranger anxiety (thus prefers to not travel by medical taxi, when he is the only passenger). He reports calling a few places already he does not recall which ones at today's visit.    Provide additional details for Care Coordination to best meet the patient's current needs: as noted.    Clinical Staff have discussed the Care Coordination Referral with the patient and/or caregiver: yes                  Who to contact     If you have questions or need follow up information about today's clinic visit or your schedule please contact  "[facility] directly at [phone].  Normal or non-critical lab and imaging results will be communicated to you by MyChart, letter or phone within 4 business days after the clinic has received the results. If you do not hear from us within 7 days, please contact the clinic through MyChart or phone. If you have a critical or abnormal lab result, we will notify you by phone as soon as possible.  Submit refill requests through B[employer] or call your pharmacy and they will forward the refill request to us. Please allow 3 business days for your refill to be completed.          Additional Information About Your Visit        [employer] Information     [employer] lets you send messages to your doctor, view your test results, renew your prescriptions, schedule appointments and more. To sign up, go to [email or web address] . Click on \"Log in\" on the left side of the screen, which will take you to the Welcome page. Then click on \"Sign up Now\" on the right side of the page.     You will be asked to enter the access code listed below, as well as some personal information. Please follow the directions to create your username and password.     Your access code is: [email or web address]  Expires: 2017  4:56 PM     Your access code will  in 90 days. If you need help or a new code, please call your [place] clinic or [phone].        Care EveryWhere ID     This is your Care EveryWhere ID. This could be used by other organizations to access your [place] medical records  [id number]        Your Vitals Were     Pulse Temperature Pulse Oximetry BMI (Body Mass Index)          53 98.3  F (36.8  C) (Oral) 97% 23.68 kg/m2         Blood Pressure from Last 3 Encounters:   17 109/54   02/15/17 107/42   17 109/48    Weight from Last 3 Encounters:   17 172 lb 3.2 oz (78.1 kg)   02/15/17 163 lb (73.9 kg)   17 163 lb (73.9 kg)              We Performed the Following     CARE COORDINATION REFERRAL        "   Today's Medication Changes          These changes are accurate as of: 3/24/17  3:21 PM.  If you have any questions, ask your nurse or doctor.               These medicines have changed or have updated prescriptions.        Dose/Directions    haloperidol 1 MG tablet   Commonly known as:  HALDOL   This may have changed:    - how much to take  - when to take this  - additional instructions   Used for:  Bipolar affective disorder, remission status unspecified (H)        Dose:  1 mg   Take 1 tablet (1 mg) by mouth 2 times daily   Quantity:  60 tablet   Refills:  1                Primary Care Provider Office Phone # Fax #    Maeve Joseph [phone]       [facility] [address] [facility] [address]        Thank you!     Thank you for choosing [facility]  for your care. Our goal is always to provide you with excellent care. Hearing back from our patients is one way we can continue to improve our services. Please take a few minutes to complete the written survey that you may receive in the mail after your visit with us. Thank you!             Your Updated Medication List - Protect others around you: Learn how to safely use, store and throw away your medicines at www.disposemymeds.org.          This list is accurate as of: 3/24/17  3:21 PM.  Always use your most recent med list.                   Brand Name Dispense Instructions for use    BENADRYL PO      25 mg Patient takes 4 tablets (100mg)at bedtime.       FLUoxetine 20 MG capsule    PROzac    30 capsule    Take 1 capsule (20 mg) by mouth daily Please take 1 capsule (20mg) by mouth daily       haloperidol 1 MG tablet    HALDOL    60 tablet    Take 1 tablet (1 mg) by mouth 2 times daily       hydrOXYzine 25 MG tablet    ATARAX    60 tablet    For lack of sleep, you can take up to 3-4 tablets (75-100mg) by mouth at bedtime.

## 2017-03-25 ASSESSMENT — ANXIETY QUESTIONNAIRES: GAD7 TOTAL SCORE: 13

## 2017-03-25 ASSESSMENT — PATIENT HEALTH QUESTIONNAIRE - PHQ9: SUM OF ALL RESPONSES TO PHQ QUESTIONS 1-9: 14

## 2017-03-28 DIAGNOSIS — F31.62 BIPOLAR DISORDER, CURRENT EPISODE MIXED, MODERATE (H): Primary | ICD-10-CM

## 2017-03-28 NOTE — LETTER
[facility]  [address] [facility] [address] [phone]        April 5, 2017    Maico Zacarias  [address] [place] [address]            Dear Maico,    I have reached out to a [place] Psychiatrist (Dr Chiang) who has reviewed your chart and advised that we add Seroquel to your regimen, starting at 50mg.   Please let me know if you have had any adverse effects on the Seroquel. If you have not, I will send a prescription for Seroquel 50mg daily, which you would take for 2 weeks and then increase to 100mg daily and we would plan to see you in another 2 weeks (ie, in a month after starting the Seroquel). You should also continue the Haldol for now. Please stop the Prozac (as it may worsen pete symptoms).     If you *have* had adverse effects on the Seroquel, let us know and we can try Abilify or Carbamazepine or Olanzapine-let me know if any of these are medications that you have *not* had adverse reactions to. The Care Coordinator will try to set you up with Psychiatry.  If you begin to feel that you want to hurt yourself or others, we advise you to call the crisis line: [phone] or [phone]. You can also call [phone].     Call our clinic ([phone]) if you have any questions.     Dr Joseph.    Results for orders placed or performed in visit on 01/11/16   PHQ-9 DEPRESSION SCREENING ORDER   Result Value Ref Range    PHQ9 SCORE 19     Narrative    [facility] CLINIC NOTE  1/11/16

## 2017-03-29 NOTE — TELEPHONE ENCOUNTER
Hi Dr Chiang,     This patient has thus far had a hard time making a clinic appointment with a Psychiatrist.  He has reported feeling more manic in the last 6 weeks, and I would greatly appreciate your advise on what would be the best next treatment step.  Please see my last 2 OV notes.  There are also notes from his previous PCP in CareEverywhere.    Thank you,  ~ Maeve.

## 2017-03-29 NOTE — TELEPHONE ENCOUNTER
I am not sure what his previous experiences have been with Seroquel but if it has not been a problem he could start 50 mg at bedtime increasing as tolerated to 200 mg or so (untill he is sleeping, less manic). He may need more than this.  If he becomes more unstable he could always go to the emergency department where they can get him started on something and hopefully set him up for referral.  The big problem is that he has to show up for appointments, otherwise is fired.

## 2017-03-31 NOTE — TELEPHONE ENCOUNTER
Hi Dr Chiang,   Thank you so much for your advise!  I had a few quick clarification questions:  1) Should we stop Haldol if start Seroquel or not (as they are similar and both prolong QTc...)?   2) Also, should we still continue the SSRI in light of his worsening pete symptoms?    Thank you,  Maeve Joseph MD

## 2017-04-02 NOTE — TELEPHONE ENCOUNTER
Please call and advise the patient as follows, and also send a letter with the same text and attach recent test results [statements which are in bold and in square brackets, like this sentence, is for clinic staff and should not be included in the text of the letter to the patient]:    Dear Maico,     I have reached out to a [place] Psychiatrist (Dr Chiang) who has reviewed your chart and advised that we add Seroquel to your regimen, starting at 50mg.   Please let me know if you have had any adverse effects on the Seroquel. If you have not, I will send a prescription for Seroquel 50mg daily, which you would take for 2 weeks and then increase to 100mg daily and we would plan to see you in another 2 weeks (ie, in a month after starting the Seroquel). You should also continue the Haldol for now. [I will pend the Seroquel prescription, which you may cosign if the patient reports no adverse effects to it in the past].  Please stop the Prozac (as it may worsen pete symptoms).    If you *have* had adverse effects on the Seroquel, let us know and we can try Abilify or Carbamazepine or Olanzapine-let me know if any of these are medications that you have *not* had adverse reactions to. [please let me know if the patient reports a history adverse effects on Seroquel, so that I can offer more detailed instructions and send a Rx for one of these alternative medications].  The Care Coordinator will try to set you up with Psychiatry.  If you begin to feel that you want to hurt yourself or others, we advise you to call the crisis line: [phone]  or [phone].  You can also call [phone].    Call our clinic ([phone]) if you have any questions.    Dr Joseph.

## 2017-04-03 RX ORDER — QUETIAPINE FUMARATE 50 MG/1
50 TABLET, EXTENDED RELEASE ORAL AT BEDTIME
Qty: 45 EACH | Refills: 1 | Status: SHIPPED | OUTPATIENT
Start: 2017-04-03 | End: 2017-05-03

## 2017-04-03 NOTE — TELEPHONE ENCOUNTER
Called patient, informed pt of providers note as written below, pt verbalized good understanding.      Patient has been on Seroquel in the past and recalls no adverse sided effects. Sent prescription to the pharmacy. Removed Prozac from med list.     Patient has also been on Abilify in the past too, he cannot recall why he was taken off med. He said, he has had multiple med changes due to insurance/cost.       Please send letter.    FYI to Dr. Maeve Huggins, JOVANN RN

## 2017-05-01 ENCOUNTER — TELEPHONE (OUTPATIENT)
Dept: INTERNAL MEDICINE | Facility: CLINIC | Age: 32
End: 2017-05-01

## 2017-05-01 NOTE — TELEPHONE ENCOUNTER
Patient calling, the county is faxing over a medical opinion form that needs to be filled out ASAP so he doesn't lose his medical coverage. States that he did sign a PATEL so that the clinic can fill it out (this was done once before but not filled out due to this issue)

## 2017-05-02 NOTE — TELEPHONE ENCOUNTER
Patient signed PATEL.  Form placed in your folder to be completed.  Thank you.  Lizzette Xavier,

## 2017-05-02 NOTE — TELEPHONE ENCOUNTER
Spoke with patient.  He will stop by the clinic to sign the release from [facility].  I will leave it at the  for him to sign.  Will give the form to provider tomorrow to complete and sign.  Lizzette Xavier,

## 2017-05-03 ENCOUNTER — OFFICE VISIT (OUTPATIENT)
Dept: INTERNAL MEDICINE | Facility: CLINIC | Age: 32
End: 2017-05-03
Payer: MEDICAID

## 2017-05-03 VITALS
TEMPERATURE: 97.7 F | BODY MASS INDEX: 23.3 KG/M2 | DIASTOLIC BLOOD PRESSURE: 56 MMHG | HEART RATE: 60 BPM | HEIGHT: 72 IN | SYSTOLIC BLOOD PRESSURE: 116 MMHG | OXYGEN SATURATION: 99 % | WEIGHT: 172 LBS

## 2017-05-03 DIAGNOSIS — F31.9 BIPOLAR AFFECTIVE DISORDER, REMISSION STATUS UNSPECIFIED (H): Primary | ICD-10-CM

## 2017-05-03 DIAGNOSIS — M25.561 RIGHT KNEE PAIN, UNSPECIFIED CHRONICITY: ICD-10-CM

## 2017-05-03 DIAGNOSIS — F31.62 BIPOLAR DISORDER, CURRENT EPISODE MIXED, MODERATE (H): ICD-10-CM

## 2017-05-03 PROCEDURE — 99214 OFFICE O/P EST MOD 30 MIN: CPT | Performed by: INTERNAL MEDICINE

## 2017-05-03 RX ORDER — HALOPERIDOL 1 MG/1
2 TABLET ORAL AT BEDTIME
Qty: 60 TABLET | Refills: 1 | Status: SHIPPED | OUTPATIENT
Start: 2017-05-03 | End: 2018-01-01

## 2017-05-03 RX ORDER — QUETIAPINE FUMARATE 50 MG/1
TABLET, EXTENDED RELEASE ORAL
Qty: 140 TABLET | Refills: 0 | Status: SHIPPED | OUTPATIENT
Start: 2017-05-03 | End: 2018-01-01

## 2017-05-03 NOTE — TELEPHONE ENCOUNTER
I spoke with the patient and scheduled an appointment for him, he is seeing you today, 5/3/17 at 6:40 pm.  Peter.  Lizzette Xavier,

## 2017-05-03 NOTE — PROGRESS NOTES
SUBJECTIVE:                                                    Maico Zacarias is a 31 year old male who presents to clinic today for the following health issues:      ADHD, Bipolar,   needs paperwork for county    Amount of exercise or physical activity: 6-7 days/week for an average of greater than 60 minutes    Problems taking medications regularly: No    Medication side effects: none    Diet: regular (no restrictions)    As per Dr Chiang's advise, we started the patient on Seroquel a month ago, d/ximena his SSRI (d/t concern for worsening manic symptoms) and will plan on d/cinig Haldol as we increase Seroquel-will also need an EKG to track the QTc on the higher dose of the antipsychotic(s)  He just started working (, stocking) at a local store.  It has been stressful for him.   He works about 13-17 hours per week.    His right knee has begun bothering him since beginning his job a few weeks ago. No noted numbness or weakness. He thinks that it may make cracking noises a times.    Of note,he is in the process of several dental visits which will result in tooth extractions-his dentist has him on high  Dose IBU plus some prn opioids.    Problem list and histories reviewed & adjusted, as indicated.  Additional history: as documented    Patient Active Problem List   Diagnosis     Bipolar affective disorder (H)     PTSD (post-traumatic stress disorder)     ADHD (attention deficit hyperactivity disorder)     CARDIOVASCULAR SCREENING; LDL GOAL LESS THAN 160     Ex-smoker     Bipolar disorder, current episode mixed, moderate (H)     Past Surgical History:   Procedure Laterality Date     NO HISTORY OF SURGERY         Social History   Substance Use Topics     Smoking status: Current Some Day Smoker     Packs/day: 1.00     Years: 4.00     Last attempt to quit: 8/7/2012     Smokeless tobacco: Never Used     Alcohol use No     Family History   Problem Relation Age of Onset     Alcohol/Drug Father      CANCER Maternal  Grandfather      Alcohol/Drug Paternal Grandfather      Depression Paternal Grandfather      Neurologic Disorder Sister          Current Outpatient Prescriptions   Medication Sig Dispense Refill     haloperidol (HALDOL) 1 MG tablet Take 2 tablets (2 mg) by mouth At Bedtime 2/15/2017 patient states he takes 2 tablets at 10pm. 60 tablet 1     QUEtiapine (SEROQUEL XR) 50 MG TB24 24 hr tablet Take 4 tablets (200 mg) by mouth at bedtime. 140 tablet 0     hydrOXYzine (ATARAX) 25 MG tablet For lack of sleep, you can take up to 3-4 tablets (75-100mg) by mouth at bedtime. (Patient not taking: Reported on 2/15/2017) 60 tablet 1       Reviewed and updated as needed this visit by clinical staff  Tobacco  Meds  Med Hx  Surg Hx  Fam Hx  Soc Hx      Reviewed and updated as needed this visit by Provider           ==============================================================  ROS:  Constitutional, HEENT, cardiovascular, pulmonary, GI, , musculoskeletal, neuro, skin, endocrine and psych systems are negative, except as otherwise noted.      OBJECTIVE:                                                    /56  Pulse 60  Temp 97.7  F (36.5  C) (Oral)  Ht 6' (1.829 m)  Wt 172 lb (78 kg)  SpO2 99%  BMI 23.33 kg/m2  Body mass index is 23.33 kg/(m^2).     GEN: not in acute distress  PSYCH: appropriate mood and affect    MSK:  Right knee:  There is mild peripatellar swelling on the medial aspect, no erythema, or warmth. There is no pain on extremes of ROM (ie, on extreme flexion), no joint line tenderness, no joint instability on the anterior and posterior drawer tests; no noted crepitus.     ASSESSMENT/PLAN:                                                        ICD-10-CM    1. Bipolar affective disorder, remission status unspecified (H) F31.9 haloperidol (HALDOL) 1 MG tablet   2. Bipolar disorder, current episode mixed, moderate (H) F31.62 QUEtiapine (SEROQUEL XR) 50 MG TB24 24 hr tablet   3. Right knee pain, unspecified  chronicity M25.561      Time spent coordinating care was approximately 30 minutes out of a total of approximately 40 minutes (which was the total duration of the appointment) including the diagnosis, prognosis and treatment of the above medical conditions.     (F31.62) Bipolar disorder, current episode mixed, moderate (H)  Comment: possible mild mood improvement-patient has recently begun working again  Plan:   As per orders above and patient instructions below.   QUEtiapine (SEROQUEL XR) 50 MG TB24 24 hr         tablet        haloperidol (HALDOL) 1 MG tablet      (M25.561) Right knee pain, unspecified chronicity  (primary encounter diagnosis)  Comment: most c/w soft tissue strain/overude injury  Plan: As per orders above and patient instructions below.       Patient Instructions   For your mood symptoms:  Please increase your Seroquel from 100mg to 200mg nightly. If your lack of sleep does not go away (and is well-controlled) after 2 weeks on this dose, please increase then increase the Seroquel to 300mg nightly.  Continue the Haldol for now.  Either way, please return to clinic in a month. We will plan on weaning off of the Haldol at that appointment and do an EKG at that appointment.  Please continue to cut down smoking and Mountain Dew use.     For your right knee:  Try to wear a neoprene knee brace, elevate and rest your knee whenever possible at home (and continue the medications which you already are using for your dental issues, including ibuprofen 800mg twice a day).                      Maeve Joseph MD  [facility]

## 2017-05-03 NOTE — TELEPHONE ENCOUNTER
I reviewed the form, the previous and the chart-the patient is overdue for a clinic visit, to follow-up on the start of Seroquel-this would allow us to complete the form (whch asks regarding the current ability to work, etc).  Please have him schedule a clinic appointment ASAP.  Thank you,  Maeve Joseph MD

## 2017-05-03 NOTE — MR AVS SNAPSHOT
After Visit Summary   5/3/2017    Maico Zacarias    MRN: [id number]           Patient Information     Date Of Birth          1985        Visit Information        Provider Department      5/3/2017 6:40 PM Maeve Joseph MD [facility]        Today's Diagnoses     Bipolar affective disorder, remission status unspecified (H)        Bipolar disorder, current episode mixed, moderate (H)          Care Instructions    For your mood symptoms:  Please increase your Seroquel from 100mg to 200mg nightly. If your lack of sleep does not go away (and is well-controlled) after 2 weeks on this dose, please increase then increase the Seroquel to 300mg nightly.  Continue the Haldol for now.  Either way, please return to clinic in a month. We will plan on weaning off of the Haldol at that appointment and do an EKG at that appointment.  Please continue to cut down smoking and Mountain Dew use.     For your right knee:  Try to wear a neoprene knee brace, elevate and rest your knee whenever possible at home (and continue the medications which you already are using for your dental issues, including ibuprofen 800mg twice a day).            Follow-ups after your visit        Who to contact     If you have questions or need follow up information about today's clinic visit or your schedule please contact [facility] directly at [phone].  Normal or non-critical lab and imaging results will be communicated to you by MyChart, letter or phone within 4 business days after the clinic has received the results. If you do not hear from us within 7 days, please contact the clinic through MyChart or phone. If you have a critical or abnormal lab result, we will notify you by phone as soon as possible.  Submit refill requests through [employer] or call your pharmacy and they will forward the refill request to us. Please allow 3 business days for your refill to be completed.          Additional  "Information About Your Visit        MyChart Information     [employer] lets you send messages to your doctor, view your test results, renew your prescriptions, schedule appointments and more. To sign up, go to [email or web address] . Click on \"Log in\" on the left side of the screen, which will take you to the Welcome page. Then click on \"Sign up Now\" on the right side of the page.     You will be asked to enter the access code listed below, as well as some personal information. Please follow the directions to create your username and password.     Your access code is: [email or web address]  Expires: 2017  7:33 PM     Your access code will  in 90 days. If you need help or a new code, please call your [place] clinic or [phone].        Care EveryWhere ID     This is your Care EveryWhere ID. This could be used by other organizations to access your [place] medical records  [id number]        Your Vitals Were     Pulse Temperature Height Pulse Oximetry BMI (Body Mass Index)       60 97.7  F (36.5  C) (Oral) 6' (1.829 m) 99% 23.33 kg/m2        Blood Pressure from Last 3 Encounters:   17 116/56   17 109/54   02/15/17 107/42    Weight from Last 3 Encounters:   17 172 lb (78 kg)   17 172 lb 3.2 oz (78.1 kg)   02/15/17 163 lb (73.9 kg)              Today, you had the following     No orders found for display         Today's Medication Changes          These changes are accurate as of: 5/3/17  7:33 PM.  If you have any questions, ask your nurse or doctor.               These medicines have changed or have updated prescriptions.        Dose/Directions    haloperidol 1 MG tablet   Commonly known as:  HALDOL   This may have changed:    - how much to take  - when to take this  - additional instructions   Used for:  Bipolar affective disorder, remission status unspecified (H)   Changed by:  Maeve Joseph MD        Dose:  2 mg   Take 2 tablets (2 mg) by mouth At Bedtime 2/15/2017 patient " states he takes 2 tablets at 10pm.   Quantity:  60 tablet   Refills:  1       QUEtiapine 50 MG Tb24 24 hr tablet   Commonly known as:  SEROquel XR   This may have changed:    - how much to take  - how to take this  - when to take this  - additional instructions   Used for:  Bipolar disorder, current episode mixed, moderate (H)   Changed by:  Maeve Joseph MD        Take 4 tablets (200 mg) by mouth at bedtime.   Quantity:  140 tablet   Refills:  0            Where to get your medicines      These medications were sent to [facility] [address] [facility] [address]  [address] [place], [facility] [address]     Phone:  [phone]     haloperidol 1 MG tablet    QUEtiapine 50 MG Tb24 24 hr tablet                Primary Care Provider Office Phone # Fax #    Maeve Joseph [phone]       [facility] [address] [facility] [address]        Thank you!     Thank you for choosing [facility]  for your care. Our goal is always to provide you with excellent care. Hearing back from our patients is one way we can continue to improve our services. Please take a few minutes to complete the written survey that you may receive in the mail after your visit with us. Thank you!             Your Updated Medication List - Protect others around you: Learn how to safely use, store and throw away your medicines at www.disposemymeds.org.          This list is accurate as of: 5/3/17  7:33 PM.  Always use your most recent med list.                   Brand Name Dispense Instructions for use    haloperidol 1 MG tablet    HALDOL    60 tablet    Take 2 tablets (2 mg) by mouth At Bedtime 2/15/2017 patient states he takes 2 tablets at 10pm.       hydrOXYzine 25 MG tablet    ATARAX    60 tablet    For lack of sleep, you can take up to 3-4 tablets (75-100mg) by mouth at bedtime.       QUEtiapine 50 MG Tb24 24 hr  tablet    SEROquel XR    140 tablet    Take 4 tablets (200 mg) by mouth at bedtime.

## 2017-05-04 ENCOUNTER — TELEPHONE (OUTPATIENT)
Dept: INTERNAL MEDICINE | Facility: CLINIC | Age: 32
End: 2017-05-04

## 2017-05-04 NOTE — TELEPHONE ENCOUNTER
Julito Isbell,     I saw this patient yesterday and asked about his progress with establishing with Psychiatry and he replied that he has not yet done so.  Please let me know if there have been any updates regarding this, which psychiatry clinics may be possibilities for him and what would the next steps to help move this process forward.     Thank you,  Maeve Joseph MD

## 2017-05-04 NOTE — PATIENT INSTRUCTIONS
For your mood symptoms:  Please increase your Seroquel from 100mg to 200mg nightly. If your lack of sleep does not go away (and is well-controlled) after 2 weeks on this dose, please increase then increase the Seroquel to 300mg nightly.  Continue the Haldol for now.  Either way, please return to clinic in a month. We will plan on weaning off of the Haldol at that appointment and do an EKG at that appointment.  Please continue to cut down smoking and Mountain Dew use.     For your right knee:  Try to wear a neoprene knee brace, elevate and rest your knee whenever possible at home (and continue the medications which you already are using for your dental issues, including ibuprofen 800mg twice a day).

## 2017-12-27 ENCOUNTER — TELEPHONE (OUTPATIENT)
Dept: INTERNAL MEDICINE | Facility: CLINIC | Age: 32
End: 2017-12-27

## 2017-12-27 NOTE — TELEPHONE ENCOUNTER
"Patient is calling to try and get in to see  this week yet if at all possible. He is reporting that when he was in a concession line at the [employer] he \"blacked out\" and fell down, when he came around a minute later he says he is not hurt or anything but he was very sweaty, he then went home, ate a few crackers and went to bed.  could not find any appointments before Wed 1/3/18, and he said he really only wants to see his primary before then. Please call to discuss and advise. Thank you  "

## 2017-12-27 NOTE — TELEPHONE ENCOUNTER
"Last night patient was standing in line at movie theater and \"blacked out\" and fell down. Patient does not eat regular meals, is a \"grazer\" throughout the day, then eats a larger meal right before bedtime. No illness, no vomiting or diarrhea, no symptoms of dehydration. No hx diabetes, no heart problems. Was standing in line for about 20 minutes then suddenly things went black, next thing he remembers he was sitting in a chair.   After event, was sweaty, clammy to touch, vision was blurry. No headache, but had pressure in top of head which dissipated immediately.   Diaphoretic for 10 minutes.   Blurred vision lasted about 3-5 minutes.    This morning felt slightly groggy, but feeling ok.     Patient would like to see pcp this week.    To provider to advise.    Ilda ALDANAN, RN, CPN    "

## 2017-12-28 NOTE — TELEPHONE ENCOUNTER
As his symptoms have improved, he would be appropriate for a clinic visit.   Advise him to make sure to drink 6-8 cups (48-64 ounces) of non-caffeinated beverages a day.   Please help him schedule a clinic appointment with me, this week.  Also, please advise him that if he has another episode of fainting, he should be taken to the ED. He should notify his close contacts of these recommendations.    Thank you,  Maeve Joseph MD

## 2017-12-28 NOTE — TELEPHONE ENCOUNTER
Patient informed of provider note as below. Appointment tomorrow with Dr. Joseph.  .Ilda ALDANAN, RN, CPN

## 2017-12-29 ENCOUNTER — OFFICE VISIT (OUTPATIENT)
Dept: INTERNAL MEDICINE | Facility: CLINIC | Age: 32
End: 2017-12-29

## 2017-12-29 VITALS
TEMPERATURE: 97.7 F | RESPIRATION RATE: 20 BRPM | OXYGEN SATURATION: 96 % | BODY MASS INDEX: 21.48 KG/M2 | DIASTOLIC BLOOD PRESSURE: 69 MMHG | WEIGHT: 158.4 LBS | SYSTOLIC BLOOD PRESSURE: 115 MMHG | HEART RATE: 78 BPM

## 2017-12-29 DIAGNOSIS — F31.62 BIPOLAR DISORDER, CURRENT EPISODE MIXED, MODERATE (H): ICD-10-CM

## 2017-12-29 DIAGNOSIS — R55 SYNCOPE, UNSPECIFIED SYNCOPE TYPE: Primary | ICD-10-CM

## 2017-12-29 DIAGNOSIS — F43.10 PTSD (POST-TRAUMATIC STRESS DISORDER): ICD-10-CM

## 2017-12-29 PROCEDURE — 99214 OFFICE O/P EST MOD 30 MIN: CPT | Performed by: INTERNAL MEDICINE

## 2017-12-29 ASSESSMENT — ANXIETY QUESTIONNAIRES
5. BEING SO RESTLESS THAT IT IS HARD TO SIT STILL: SEVERAL DAYS
IF YOU CHECKED OFF ANY PROBLEMS ON THIS QUESTIONNAIRE, HOW DIFFICULT HAVE THESE PROBLEMS MADE IT FOR YOU TO DO YOUR WORK, TAKE CARE OF THINGS AT HOME, OR GET ALONG WITH OTHER PEOPLE: SOMEWHAT DIFFICULT
2. NOT BEING ABLE TO STOP OR CONTROL WORRYING: SEVERAL DAYS
3. WORRYING TOO MUCH ABOUT DIFFERENT THINGS: MORE THAN HALF THE DAYS
GAD7 TOTAL SCORE: 12
1. FEELING NERVOUS, ANXIOUS, OR ON EDGE: NEARLY EVERY DAY
6. BECOMING EASILY ANNOYED OR IRRITABLE: NEARLY EVERY DAY
7. FEELING AFRAID AS IF SOMETHING AWFUL MIGHT HAPPEN: SEVERAL DAYS

## 2017-12-29 ASSESSMENT — PATIENT HEALTH QUESTIONNAIRE - PHQ9
5. POOR APPETITE OR OVEREATING: SEVERAL DAYS
SUM OF ALL RESPONSES TO PHQ QUESTIONS 1-9: 12

## 2017-12-29 NOTE — MR AVS SNAPSHOT
After Visit Summary   12/29/2017    Maico Zacarias    MRN: [id number]           Patient Information     Date Of Birth          1985        Visit Information        Provider Department      12/29/2017 10:30 AM Maeve Joseph MD [facility] Instructions    Please drink 6-8 cups (48-64 ounces) of non-caffeinated beverages a day.   Avoid energy drinks, especially Gatorade. Some may contain ingredients like caffeine or various types of sugars which could dehydrate you.   See below regarding fainting.  If you get another episode of fainting, despite the above measures, please call 911 (or talk to someone ahead of time, if they see you faint you faint, to call 911).                Fainting: Vagal Reaction  Fainting (syncope) is a temporary loss of consciousness that is associated with a loss of postural tone. It s also called passing out. It occurs when blood flow to the brain is less than normal. Your healthcare provider believes that your fainting was because of a vagal reaction. This condition is not a sign of serious disease.  A vagal reaction is a response in your body that causes your pulse to slow down or the blood vessels to expand. This causes your blood pressure to fall. And this sends less blood to your brain if you are standing or sitting. That results in dizziness, near-fainting, or fainting. Lying down usually stops the reaction within 60 seconds.  This response can occur during sudden fear, severe pain, emotional stress, overexertion, overheating, hunger, nausea or vomiting, prolonged standing, or standing up after sitting or lying for a long time.  Home care  Follow these guidelines when caring for yourself at home:    Rest today. Go back to your normal activities as soon as you are feeling back to normal.    Stay hydrated and avoid skipping meals.    If you feel lightheaded or dizzy, lie down right away. Or sit with your head lowered between your  "knees.  Follow-up care  Follow up with your healthcare provider, or as advised.  When to seek medical advice  Call your healthcare provider right away if any of these occur:    Another fainting spell that s not explained by the common causes listed above    Pain in your chest, arm, neck, jaw, back, or abdomen    Shortness of breath    Severe headache or seizure    Your heart beats very rapidly, very slowly, or irregularly (palpitations)  Date Last Reviewed: 12/1/2016 2000-2017 [employer]. [address] [place] [address]. All rights reserved. This information is not intended as a substitute for professional medical care. Always follow your healthcare professional's instructions.                Follow-ups after your visit        Who to contact     If you have questions or need follow up information about today's clinic visit or your schedule please contact [facility] directly at [phone].  Normal or non-critical lab and imaging results will be communicated to you by MyChart, letter or phone within 4 business days after the clinic has received the results. If you do not hear from us within 7 days, please contact the clinic through stiQRdhart or phone. If you have a critical or abnormal lab result, we will notify you by phone as soon as possible.  Submit refill requests through [employer] or call your pharmacy and they will forward the refill request to us. Please allow 3 business days for your refill to be completed.          Additional Information About Your Visit        stiQRdhart Information     [facility] lets you send messages to your doctor, view your test results, renew your prescriptions, schedule appointments and more. To sign up, go to [email or web address] . Click on \"Log in\" on the left side of the screen, which will take you to the Welcome page. Then click on \"Sign up Now\" on the right side of the page.     You will be asked to enter the access code listed below, as " well as some personal information. Please follow the directions to create your username and password.     Your access code is: [email or web address]  Expires: 3/29/2018 11:37 AM     Your access code will  in 90 days. If you need help or a new code, please call your [place] clinic or [phone].        Care EveryWhere ID     This is your Care EveryWhere ID. This could be used by other organizations to access your [place] medical records  [id number]        Your Vitals Were     Pulse Temperature Respirations Pulse Oximetry BMI (Body Mass Index)       78 97.7  F (36.5  C) (Oral) 20 96% 21.48 kg/m2        Blood Pressure from Last 3 Encounters:   17 115/69   17 116/56   17 109/54    Weight from Last 3 Encounters:   17 158 lb 6.4 oz (71.8 kg)   17 172 lb (78 kg)   17 172 lb 3.2 oz (78.1 kg)              Today, you had the following     No orders found for display       Primary Care Provider Office Phone # Fax #    Maeve Joseph [phone] [address] [facility] [address]        Equal Access to Services     JES BLAKE : Hadii nadia ku hadasho Soomaali, waaxda luqadaha, qaybta kaalmada adeegyada, waxgabriela meredith . So [facility] [phone].    ATENCIÓN: Si habla español, tiene a porter disposición servicios gratuitos de asistencia lingüística. Llame al [phone].    We comply with applicable federal civil rights laws and [place] laws. We do not discriminate on the basis of race, color, national origin, age, disability, sex, sexual orientation, or gender identity.            Thank you!     Thank you for choosing [facility]  for your care. Our goal is always to provide you with excellent care. Hearing back from our patients is one way we can continue to improve our services. Please take a few minutes to complete the written survey that you may receive in the mail after your visit with us. Thank you!              Your Updated Medication List - Protect others around you: Learn how to safely use, store and throw away your medicines at www.disposemymeds.org.          This list is accurate as of: 12/29/17 11:38 AM.  Always use your most recent med list.                   Brand Name Dispense Instructions for use Diagnosis    haloperidol 1 MG tablet    HALDOL    60 tablet    Take 2 tablets (2 mg) by mouth At Bedtime 2/15/2017 patient states he takes 2 tablets at 10pm.    Bipolar affective disorder, remission status unspecified (H)       hydrOXYzine 25 MG tablet    ATARAX    60 tablet    For lack of sleep, you can take up to 3-4 tablets (75-100mg) by mouth at bedtime.    Bipolar affective disorder, remission status unspecified (H)       QUEtiapine 50 MG Tb24 24 hr tablet    SEROquel XR    140 tablet    Take 4 tablets (200 mg) by mouth at bedtime.    Bipolar disorder, current episode mixed, moderate (H)

## 2017-12-29 NOTE — PATIENT INSTRUCTIONS
Please drink 6-8 cups (48-64 ounces) of non-caffeinated beverages a day.   Avoid energy drinks, especially Gatorade. Some may contain ingredients like caffeine or various types of sugars which could dehydrate you.   See below regarding fainting.  If you get another episode of fainting, despite the above measures, please call 911 (or talk to someone ahead of time, if they see you faint you faint, to call 911).                Fainting: Vagal Reaction  Fainting (syncope) is a temporary loss of consciousness that is associated with a loss of postural tone. It s also called passing out. It occurs when blood flow to the brain is less than normal. Your healthcare provider believes that your fainting was because of a vagal reaction. This condition is not a sign of serious disease.  A vagal reaction is a response in your body that causes your pulse to slow down or the blood vessels to expand. This causes your blood pressure to fall. And this sends less blood to your brain if you are standing or sitting. That results in dizziness, near-fainting, or fainting. Lying down usually stops the reaction within 60 seconds.  This response can occur during sudden fear, severe pain, emotional stress, overexertion, overheating, hunger, nausea or vomiting, prolonged standing, or standing up after sitting or lying for a long time.  Home care  Follow these guidelines when caring for yourself at home:    Rest today. Go back to your normal activities as soon as you are feeling back to normal.    Stay hydrated and avoid skipping meals.    If you feel lightheaded or dizzy, lie down right away. Or sit with your head lowered between your knees.  Follow-up care  Follow up with your healthcare provider, or as advised.  When to seek medical advice  Call your healthcare provider right away if any of these occur:    Another fainting spell that s not explained by the common causes listed above    Pain in your chest, arm, neck, jaw, back, or  abdomen    Shortness of breath    Severe headache or seizure    Your heart beats very rapidly, very slowly, or irregularly (palpitations)  Date Last Reviewed: 12/1/2016 2000-2017 [employer]. [address] [place] [address], [place] [address]. All rights reserved. This information is not intended as a substitute for professional medical care. Always follow your healthcare professional's instructions.

## 2017-12-29 NOTE — PROGRESS NOTES
"  SUBJECTIVE:   Maico Zacarias is a 32 year old male who presents to clinic today for the following health issues:        Episode of loss of consciousness:      Duration: once, for about \"a minute or two\"-the patient is unsure of the exact duration, he got this information from stories told over by his coworkers.     DAccompanying signs and symptoms: sweaty, felt no other symptoms prior to blacking out. Vision was transiently blurry after.    History (similar episodes/previous evaluation): None    Precipitating or alleviating factors: None    Therapies tried and outcome: None     He was reluctant to come to the clinic appointment today, but was told at his place of work that he had to do so, in order to be able to continue working.     Thus as noted above, he had a prodrome of diaphoresis prior to fainting.     Patient reports running out of medications in July (last seen by me in 5/2017) and that he got a job and that he had no time for doctor appointments. His mood is in some ways good-ie, he is cheerful with customers at work, but still has mood symptoms.    Patient's orthostatics are mildly positive today (HR, supine vs standing);  He does drink energy drinks.  He denies symptoms of arrhythmia, or of stroke or of seizures.  He denies recent head injuries.  He denies recent illness, recent f/c, diarrhea.     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 His mood is in some ways good-ie, he is cheerful with customers at work, but still has mood symptoms.    His PHQ-9 and EAN-7 scores are high today, and mildly better than previously (when he was on medications):  PHQ-9 SCORE 3/24/2017 12/29/2017   Total Score 14 12     EAN-7 SCORE 3/24/2017 12/29/2017   Total Score 13 12     His score on the MDQ (bipolar) questionnaire is borderline positive, however, he feels " that his mood is better than before, his job keeps him anchored and he does not feel that he needs any medications.  He was again offered to establish with psychiatry/psychology, but he does not feel that he needs this.  He was reminded to contact us if he should change his mind.    He denies suicidal ideation or HI.     Reviewed and updated as needed this visit by clinical staffTobacco  Allergies  Meds  Problems  Med Hx  Surg Hx  Fam Hx  Soc Hx        Reviewed and updated as needed this visit by Provider  Allergies  Meds  Problems           Patient Active Problem List   Diagnosis     Bipolar affective disorder (H)     PTSD (post-traumatic stress disorder)     ADHD (attention deficit hyperactivity disorder)     CARDIOVASCULAR SCREENING; LDL GOAL LESS THAN 160     Ex-smoker     Bipolar disorder, current episode mixed, moderate (H)       Past Medical History:   Diagnosis Date     ADHD (attention deficit hyperactivity disorder)      Bipolar affective disorder (H)      PTSD (post-traumatic stress disorder)     per patient report     Tobacco abuse        Past Surgical History:   Procedure Laterality Date     NO HISTORY OF SURGERY         Family History   Problem Relation Age of Onset     Alcohol/Drug Father      CANCER Maternal Grandfather      Alcohol/Drug Paternal Grandfather      Depression Paternal Grandfather      Neurologic Disorder Sister        Social History   Substance Use Topics     Smoking status: Current Some Day Smoker     Packs/day: 1.00     Years: 4.00     Last attempt to quit: 8/7/2012     Smokeless tobacco: Never Used     Alcohol use No       No current outpatient prescriptions on file.     No current facility-administered medications for this visit.          ROS:  Constitutional, HEENT, cardiovascular, pulmonary, GI, , musculoskeletal, neuro, skin, endocrine and psych systems are negative, except as otherwise noted.     OBJECTIVE:                                                    /69  (Patient Position: Standing)  Pulse 78  Temp 97.7  F (36.5  C) (Oral)  Resp 20  Wt 158 lb 6.4 oz (71.8 kg)  SpO2 96%  BMI 21.48 kg/m2   Vitals:    12/29/17 1040 12/29/17 1046 12/29/17 1055 12/29/17 1100   BP: 103/48 105/63 105/59 115/69   Patient Position:  Supine Sitting Standing   Pulse: 57 66 59 78   Resp: 20      Temp: 97.7  F (36.5  C)      TempSrc: Oral      SpO2: 99% 99% 98% 96%   Weight: 158 lb 6.4 oz (71.8 kg)          GENERAL APPEARANCE: healthy, alert and in no distress  EYES: Eyes grossly normal to inspection, and conjunctivae and sclerae normal  HENT: head normocephalic and atraumatic and mouth without ulcers or lesions, oropharynx clear and oral mucous membranes moist  NECK: no noticeable adenopathy, no asymmetry, masses, or scars   RESP: lungs clear to auscultation - no rales, rhonchi or wheezes  CV: regular rate and rhythm, normal S1 S2, no S3 or S4, no murmur, click or rub, no peripheral edema and peripheral pulses strong  ABDOMEN: soft, nontender, no hepatosplenomegaly, no masses and bowel sounds normal  MS: no musculoskeletal defects are noted and gait is age appropriate without ataxia  SKIN: no suspicious lesions or rashes  NEURO: mentation intact and speech normal  PSYCH: mentation appears normal and affect normal/bright.    Results for orders placed or performed in visit on 01/11/16   PHQ-9 DEPRESSION SCREENING ORDER   Result Value Ref Range    PHQ9 SCORE 19     Narrative    [facility] CLINIC NOTE  1/11/16       No results found for this or any previous visit (from the past 744 hour(s)).      ASSESSMENT/PLAN:                                                        ICD-10-CM    1. Syncope, unspecified syncope type R55    2. Bipolar disorder, current episode mixed, moderate (H) F31.62    3. PTSD (post-traumatic stress disorder) F43.10      1: most c/w vaso-vagal. PLAN: As per orders above and patient instructions below.  2,3: patient currently declines all active treatment; there are  "currently no reasons to insist on reinitiation of MH treatment against his will. PLAN: the patient was encouraged to seek medical attention should his mood symptoms worsen and if he should decide to seek treatment even before that, as his mood scores have not recently shown good control on or off the most-recently tried medications. He was advised of the dangers of waiting until a \"MH crisis\" to seek treatment, as opposed to trying to get this under control.    Patient Instructions   Please drink 6-8 cups (48-64 ounces) of non-caffeinated beverages a day.   Avoid energy drinks, especially Gatorade. Some may contain ingredients like caffeine or various types of sugars which could dehydrate you.   See below regarding fainting.  If you get another episode of fainting, despite the above measures, please call 911 (or talk to someone ahead of time, if they see you faint you faint, to call 911).                Fainting: Vagal Reaction  Fainting (syncope) is a temporary loss of consciousness that is associated with a loss of postural tone. It s also called passing out. It occurs when blood flow to the brain is less than normal. Your healthcare provider believes that your fainting was because of a vagal reaction. This condition is not a sign of serious disease.  A vagal reaction is a response in your body that causes your pulse to slow down or the blood vessels to expand. This causes your blood pressure to fall. And this sends less blood to your brain if you are standing or sitting. That results in dizziness, near-fainting, or fainting. Lying down usually stops the reaction within 60 seconds.  This response can occur during sudden fear, severe pain, emotional stress, overexertion, overheating, hunger, nausea or vomiting, prolonged standing, or standing up after sitting or lying for a long time.  Home care  Follow these guidelines when caring for yourself at home:    Rest today. Go back to your normal activities as soon as " you are feeling back to normal.    Stay hydrated and avoid skipping meals.    If you feel lightheaded or dizzy, lie down right away. Or sit with your head lowered between your knees.  Follow-up care  Follow up with your healthcare provider, or as advised.  When to seek medical advice  Call your healthcare provider right away if any of these occur:    Another fainting spell that s not explained by the common causes listed above    Pain in your chest, arm, neck, jaw, back, or abdomen    Shortness of breath    Severe headache or seizure    Your heart beats very rapidly, very slowly, or irregularly (palpitations)  Date Last Reviewed: 12/1/2016 2000-2017 [employer]. [address] [place] [address]. All rights reserved. This information is not intended as a substitute for professional medical care. Always follow your healthcare professional's instructions.            Maeve Joseph MD    [address] [id number] [phone]

## 2017-12-30 ASSESSMENT — ANXIETY QUESTIONNAIRES: GAD7 TOTAL SCORE: 12

## 2018-06-12 ENCOUNTER — OFFICE VISIT (OUTPATIENT)
Dept: URGENT CARE | Facility: URGENT CARE | Age: 33
End: 2018-06-12
Payer: OTHER MISCELLANEOUS

## 2018-06-12 VITALS
OXYGEN SATURATION: 98 % | DIASTOLIC BLOOD PRESSURE: 68 MMHG | TEMPERATURE: 98.7 F | WEIGHT: 158 LBS | HEIGHT: 72 IN | HEART RATE: 60 BPM | SYSTOLIC BLOOD PRESSURE: 118 MMHG | BODY MASS INDEX: 21.4 KG/M2 | RESPIRATION RATE: 14 BRPM

## 2018-06-12 DIAGNOSIS — S09.90XA CLOSED HEAD INJURY, INITIAL ENCOUNTER: Primary | ICD-10-CM

## 2018-06-12 DIAGNOSIS — R41.3 MEMORY IMPAIRMENT: ICD-10-CM

## 2018-06-12 PROCEDURE — 99214 OFFICE O/P EST MOD 30 MIN: CPT | Performed by: PHYSICIAN ASSISTANT

## 2018-06-12 ASSESSMENT — ENCOUNTER SYMPTOMS
CARDIOVASCULAR NEGATIVE: 1
SPEECH CHANGE: 0
CONSTITUTIONAL NEGATIVE: 1
TINGLING: 0
FEVER: 0
HEADACHES: 1
TREMORS: 0
HEMOPTYSIS: 0
DIAPHORESIS: 0
COUGH: 0
DIZZINESS: 1
BLURRED VISION: 1
WEIGHT LOSS: 0
FOCAL WEAKNESS: 0
LOSS OF CONSCIOUSNESS: 0
EYE PAIN: 0
PALPITATIONS: 0
MEMORY LOSS: 1
NAUSEA: 1
RESPIRATORY NEGATIVE: 1
SENSORY CHANGE: 0
SEIZURES: 0

## 2018-06-12 NOTE — MR AVS SNAPSHOT
After Visit Summary   6/12/2018    Maico Zacarias    MRN: [id number]           Patient Information     Date Of Birth          1985        Visit Information        Provider Department      6/12/2018 6:30 PM Payal Luna PA-C [facility]        Today's Diagnoses     Closed head injury, initial encounter    -  1    Memory impairment           Follow-ups after your visit        Who to contact     If you have questions or need follow up information about today's clinic visit or your schedule please contact [facility] directly at [phone].  Normal or non-critical lab and imaging results will be communicated to you by MyChart, letter or phone within 4 business days after the clinic has received the results. If you do not hear from us within 7 days, please contact the clinic through MyChart or phone. If you have a critical or abnormal lab result, we will notify you by phone as soon as possible.  Submit refill requests through [facility] or call your pharmacy and they will forward the refill request to us. Please allow 3 business days for your refill to be completed.          Additional Information About Your Visit        Care EveryWhere ID     This is your Care EveryWhere ID. This could be used by other organizations to access your [place] medical records  [id number]        Your Vitals Were     Pulse Temperature Respirations Height Pulse Oximetry BMI (Body Mass Index)    60 98.7  F (37.1  C) (Oral) 14 6' (1.829 m) 98% 21.43 kg/m2       Blood Pressure from Last 3 Encounters:   06/12/18 118/68   12/29/17 115/69   05/03/17 116/56    Weight from Last 3 Encounters:   06/12/18 158 lb (71.7 kg)   12/29/17 158 lb 6.4 oz (71.8 kg)   05/03/17 172 lb (78 kg)              Today, you had the following     No orders found for display       Primary Care Provider Office Phone # Fax #    Maeve Joseph [phone] [address] [address] [facility] [address]         Equal Access to Services     [facility] : Hadii nadia Zamora, waesthela bustamante, antoninarachel chang. So [facility] [phone].    ATENCIÓN: Si habla español, tiene a porter disposición servicios gratuitos de asistencia lingüística. Llame al [phone].    We comply with applicable federal civil rights laws and [place] laws. We do not discriminate on the basis of race, color, national origin, age, disability, sex, sexual orientation, or gender identity.            Thank you!     Thank you for choosing [facility]  [facility]. Our goal is always to provide you with excellent care. Hearing back from our patients is one way we can continue to improve our services. Please take a few minutes to complete the written survey that you may receive in the mail after your visit with us. Thank you!             Your Updated Medication List - Protect others around you: Learn how to safely use, store and throw away your medicines at www.disposemymeds.org.      Notice  As of 6/12/2018  7:25 PM    You have not been prescribed any medications.
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
